# Patient Record
Sex: FEMALE | URBAN - METROPOLITAN AREA
[De-identification: names, ages, dates, MRNs, and addresses within clinical notes are randomized per-mention and may not be internally consistent; named-entity substitution may affect disease eponyms.]

---

## 2022-04-12 ENCOUNTER — OFFICE VISIT (OUTPATIENT)
Dept: URBAN - METROPOLITAN AREA CLINIC 6 | Facility: CLINIC | Age: 52
End: 2022-04-12

## 2022-04-12 DIAGNOSIS — B02.33 ZOSTER KERATITIS: Primary | ICD-10-CM

## 2022-04-12 PROCEDURE — 92002 INTRM OPH EXAM NEW PATIENT: CPT | Performed by: OPTOMETRIST

## 2022-04-12 RX ORDER — TRIFLURIDINE 1 G/100ML
1 % SOLUTION OPHTHALMIC
Qty: 7.5 | Refills: 0 | Status: ACTIVE
Start: 2022-04-12

## 2022-04-12 NOTE — IMPRESSION/PLAN
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 Plan: Emergency visit for HZV Dermatitis / Keratitis LT - Start Valacyclovir 1000mg PO q8h. Given corneal involvement, start viroptic Q2h to the affected eye. Stressed the importance of contact precautions.

## 2022-04-19 ENCOUNTER — OFFICE VISIT (OUTPATIENT)
Dept: URBAN - METROPOLITAN AREA CLINIC 6 | Facility: CLINIC | Age: 52
End: 2022-04-19

## 2022-04-19 DIAGNOSIS — B02.33 ZOSTER KERATITIS: Primary | ICD-10-CM

## 2022-04-19 PROCEDURE — 92012 INTRM OPH EXAM EST PATIENT: CPT | Performed by: OPTOMETRIST

## 2022-04-19 RX ORDER — VALACYCLOVIR 1 G/1
TABLET, FILM COATED ORAL
Qty: 14 | Refills: 0 | Status: INACTIVE
Start: 2022-04-19 | End: 2022-04-25

## 2022-04-19 RX ORDER — PREDNISOLONE ACETATE 10 MG/ML
1 % SUSPENSION/ DROPS OPHTHALMIC
Qty: 10 | Refills: 0 | Status: ACTIVE
Start: 2022-04-19

## 2022-04-19 RX ORDER — METHYLPREDNISOLONE 4 MG/1
4 MG TABLET ORAL
Qty: 21 | Refills: 0 | Status: ACTIVE
Start: 2022-04-19

## 2022-04-19 ASSESSMENT — INTRAOCULAR PRESSURE
OD: 8
OS: 13

## 2022-04-19 NOTE — IMPRESSION/PLAN
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 Plan: Follow up visit for HZV Dermatitis / Keratitis LT - Continue Valacyclovir 1000mg PO BID. Given corneal involvement, continue Viroptic QID to the affected eye and start Prednisolone BID. Start medrol dose hiren. Stressed the importance of contact precautions.

## 2022-05-03 ENCOUNTER — OFFICE VISIT (OUTPATIENT)
Dept: URBAN - METROPOLITAN AREA CLINIC 6 | Facility: CLINIC | Age: 52
End: 2022-05-03

## 2022-05-03 DIAGNOSIS — B02.33 ZOSTER KERATITIS: Primary | ICD-10-CM

## 2022-05-03 PROCEDURE — 92002 INTRM OPH EXAM NEW PATIENT: CPT | Performed by: OPHTHALMOLOGY

## 2022-05-03 RX ORDER — ACYCLOVIR 400 MG/1
400 MG TABLET ORAL
Qty: 60 | Refills: 2 | Status: INACTIVE
Start: 2022-05-03 | End: 2022-05-24

## 2022-05-03 ASSESSMENT — INTRAOCULAR PRESSURE
OS: 8
OD: 13

## 2022-05-03 NOTE — IMPRESSION/PLAN
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 Plan: Follow up visit for HZV Dermatitis / Keratitis LT - Improving. START alacyclovir 400mg PO BID. Taper Viroptic QD to the affected eye for 5 days then DC and cont Prednisolone gtts BID. IOP good. AC quiet. Has midilated irreactive pupil. Explained that that might persist. FP/RNFL today.

## 2022-05-24 ENCOUNTER — OFFICE VISIT (OUTPATIENT)
Dept: URBAN - METROPOLITAN AREA CLINIC 6 | Facility: CLINIC | Age: 52
End: 2022-05-24

## 2022-05-24 PROCEDURE — 92012 INTRM OPH EXAM EST PATIENT: CPT | Performed by: OPHTHALMOLOGY

## 2022-05-24 RX ORDER — PREDNISOLONE ACETATE 10 MG/ML
1 % SUSPENSION/ DROPS OPHTHALMIC
Qty: 10 | Refills: 0 | Status: ACTIVE
Start: 2022-05-24

## 2022-05-24 RX ORDER — ACYCLOVIR 400 MG/1
400 MG TABLET ORAL
Qty: 60 | Refills: 2 | Status: ACTIVE
Start: 2022-05-24

## 2022-05-24 ASSESSMENT — INTRAOCULAR PRESSURE
OD: 13
OS: 11

## 2022-05-24 NOTE — IMPRESSION/PLAN
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 Plan: Follow up visit for HZV Dermatitis / Keratitis LT - K clear. RE-START alacyclovir 400mg PO BID. Taper Viroptic QD to the affected eye for 5 days then DC and cont Prednisolone gtts BID. IOP good. AC quiet. Has midilated irreactive pupil. Explained that that might persist. Fundus exam with no optic neuritis/retinitis/vitritis.  RTC 6 wks, sooner prn

## 2022-07-05 ENCOUNTER — OFFICE VISIT (OUTPATIENT)
Dept: URBAN - METROPOLITAN AREA CLINIC 6 | Facility: CLINIC | Age: 52
End: 2022-07-05

## 2022-07-05 DIAGNOSIS — B02.33 ZOSTER KERATITIS: Primary | ICD-10-CM

## 2022-07-05 PROCEDURE — 92012 INTRM OPH EXAM EST PATIENT: CPT | Performed by: OPTOMETRIST

## 2022-07-05 RX ORDER — ACYCLOVIR 400 MG/1
400 MG TABLET ORAL
Qty: 60 | Refills: 1 | Status: ACTIVE
Start: 2022-07-05

## 2022-07-05 ASSESSMENT — INTRAOCULAR PRESSURE
OS: 9
OD: 14

## 2022-07-05 NOTE — IMPRESSION/PLAN
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 Plan: Follow up visit for HZV Dermatitis / Keratitis LT - K clear. Continue alacyclovir 400mg PO BID. Cont Prednisolone gtts TID. IOP good. Mild Inflammation in AC. Has midilated irreactive pupil. Explained that that might persist.  RTC 1 month for IOP check and FU.

## 2022-08-23 ENCOUNTER — OFFICE VISIT (OUTPATIENT)
Dept: URBAN - METROPOLITAN AREA CLINIC 6 | Facility: CLINIC | Age: 52
End: 2022-08-23

## 2022-08-23 DIAGNOSIS — B02.33 ZOSTER KERATITIS: Primary | ICD-10-CM

## 2022-08-23 PROCEDURE — 92012 INTRM OPH EXAM EST PATIENT: CPT | Performed by: OPTOMETRIST

## 2022-08-23 ASSESSMENT — INTRAOCULAR PRESSURE
OD: 13
OS: 7

## 2022-08-23 NOTE — IMPRESSION/PLAN
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 Plan: Follow up visit for HZV Dermatitis / Keratitis LT - K clear. Continue acyclovir 400mg PO BID. Cont Prednisolone gtts BID. IOP good. Mild Inflammation in AC. Has mid dilated irreactive pupil. Explained that might persist.  IOP is normal. Ptosis and epiphora. Plan to start nighttime ointment or gel. Recommend consult with Dr Pilo Preciado for oculoplastics.

## 2022-10-18 ENCOUNTER — OFFICE VISIT (OUTPATIENT)
Dept: URBAN - METROPOLITAN AREA CLINIC 6 | Facility: CLINIC | Age: 52
End: 2022-10-18

## 2022-10-18 DIAGNOSIS — H26.9 CATARACT: ICD-10-CM

## 2022-10-18 DIAGNOSIS — B02.33 ZOSTER KERATITIS: Primary | ICD-10-CM

## 2022-10-18 DIAGNOSIS — H16.232 NEUROTROPHIC KERATOCONJUNCTIVITIS, LEFT EYE: ICD-10-CM

## 2022-10-18 PROCEDURE — 92285 EXTERNAL OCULAR PHOTOGRAPHY: CPT | Performed by: OPHTHALMOLOGY

## 2022-10-18 PROCEDURE — 99214 OFFICE O/P EST MOD 30 MIN: CPT | Performed by: OPHTHALMOLOGY

## 2022-10-18 RX ORDER — ERYTHROMYCIN 5 MG/G
OINTMENT OPHTHALMIC
Qty: 3.5 | Refills: 3 | Status: ACTIVE
Start: 2022-10-18

## 2022-10-18 ASSESSMENT — INTRAOCULAR PRESSURE
OS: 5
OD: 15

## 2022-10-18 NOTE — IMPRESSION/PLAN
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 Plan: Patient examined. Chart reviewed. Patient has severe scarring of the skin in the distribution of left V1, secondary ptosis, secondary chronic neurotrophic keratopathy and severe medicomentos. I advised: 1. Aggressive faciak massage BID with CeraVe Healing Ointment and Cicaplast.

2. D/C current ophthalmic antibiotic drops, and replace with EES OPhthalmic Ointment (use TID OS.) 3. Tarsorrhaphy, left lids advised ASAP. Quote fe3e for [id number].

4.  Continue acyclovir 400mg PO BID. Cont Prednisolone gtts BID.

## 2022-10-31 ENCOUNTER — PROCEDURE (OUTPATIENT)
Dept: URBAN - METROPOLITAN AREA CLINIC 3 | Facility: CLINIC | Age: 52
End: 2022-10-31

## 2022-10-31 DIAGNOSIS — H16.232 NEUROTROPHIC KERATOCONJUNCTIVITIS, LEFT EYE: ICD-10-CM

## 2022-10-31 DIAGNOSIS — B02.33 ZOSTER KERATITIS: Primary | ICD-10-CM

## 2022-10-31 PROCEDURE — 67875 CLOSURE OF EYELID BY SUTURE: CPT | Performed by: OPHTHALMOLOGY

## 2022-10-31 RX ORDER — ACYCLOVIR 400 MG/1
400 MG TABLET ORAL
Qty: 60 | Refills: 1 | Status: ACTIVE
Start: 2022-10-31

## 2022-10-31 RX ORDER — PREDNISOLONE ACETATE 10 MG/ML
1 % SUSPENSION/ DROPS OPHTHALMIC
Qty: 10 | Refills: 0 | Status: ACTIVE
Start: 2022-10-31

## 2022-10-31 NOTE — IMPRESSION/PLAN
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 Plan: Under the care of Dr. Melvi Conrad who recommended a tarsorrhapy. Temporary lateral tarsorrhaphy placed in the left lateral [place] area under local anesthesia w/o complications. Cont. Acyclovir 400mg Po BID and PF 1% BID. F/U per schedule with Dr. Melvi Conrad.

## 2022-11-15 ENCOUNTER — OFFICE VISIT (OUTPATIENT)
Dept: URBAN - METROPOLITAN AREA CLINIC 6 | Facility: CLINIC | Age: 52
End: 2022-11-15

## 2022-11-15 DIAGNOSIS — B02.33 ZOSTER KERATITIS: Primary | ICD-10-CM

## 2022-11-15 PROCEDURE — 92012 INTRM OPH EXAM EST PATIENT: CPT | Performed by: OPHTHALMOLOGY

## 2022-11-15 ASSESSMENT — INTRAOCULAR PRESSURE: OD: 15

## 2022-11-15 NOTE — IMPRESSION/PLAN
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 Plan: Patient examined. Chart reviewed. Patient has severe scarring of the skin in the distribution of left V1, secondary ptosis, secondary chronic neurotrophic keratopathy and severe medicomentos. Aggressive facial massage BID with CeraVe Healing Ointment and Cicaplast.

 Tarsorrhaphy, left lids advised ASAP. Plan for 73401 in clinic block 30 min slot Continue acyclovir 400mg PO BID. Cont Prednisolone gtts BID.

## 2022-12-13 ENCOUNTER — PROCEDURE (OUTPATIENT)
Dept: URBAN - METROPOLITAN AREA CLINIC 6 | Facility: CLINIC | Age: 52
End: 2022-12-13

## 2022-12-13 DIAGNOSIS — B02.33 ZOSTER KERATITIS: Primary | ICD-10-CM

## 2022-12-13 PROCEDURE — 67875 CLOSURE OF EYELID BY SUTURE: CPT | Performed by: OPHTHALMOLOGY

## 2022-12-13 PROCEDURE — 92012 INTRM OPH EXAM EST PATIENT: CPT | Performed by: OPHTHALMOLOGY

## 2022-12-13 RX ORDER — ERYTHROMYCIN 5 MG/G
OINTMENT OPHTHALMIC
Qty: 3.5 | Refills: 3 | Status: ACTIVE
Start: 2022-12-13

## 2022-12-13 ASSESSMENT — INTRAOCULAR PRESSURE
OS: 6
OD: 12

## 2022-12-13 NOTE — IMPRESSION/PLAN
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 Plan: Patient examined. Chart reviewed. Patient has severe scarring of the skin in the distribution of left V1, secondary ptosis, secondary chronic neurotrophic keratopathy and severe medicomentos. Aggressive facial massage BID with CeraVe Healing Ointment and Cicaplast.

Tarsorrhaphy, left lids [id number] Continue acyclovir 400mg PO BID. D/C Prednisolone gtts.  Start erythromycin baltazar QHS OS

## 2023-01-10 ENCOUNTER — OFFICE VISIT (OUTPATIENT)
Dept: URBAN - METROPOLITAN AREA CLINIC 6 | Facility: CLINIC | Age: 53
End: 2023-01-10

## 2023-01-10 DIAGNOSIS — B02.33 ZOSTER KERATITIS: Primary | ICD-10-CM

## 2023-01-10 PROCEDURE — 92012 INTRM OPH EXAM EST PATIENT: CPT | Performed by: OPHTHALMOLOGY

## 2023-01-10 RX ORDER — ERYTHROMYCIN 5 MG/G
OINTMENT OPHTHALMIC
Qty: 3.5 | Refills: 3 | Status: ACTIVE
Start: 2023-01-10

## 2023-01-10 ASSESSMENT — INTRAOCULAR PRESSURE
OD: 13
OS: 5

## 2023-01-10 NOTE — IMPRESSION/PLAN
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 Plan: Patient examined. Chart reviewed. Patient has severe scarring of the skin in the distribution of left V1, secondary ptosis, secondary chronic neurotrophic keratopathy and severe medicomentos. Continue facial massage BID with CeraVe Healing Ointment and Cicaplast.

Much improved. Continue acyclovir 400mg PO BID. Continue erythromycin baltazar QHS OS. No sign of infection. No lag.

## 2023-04-04 ENCOUNTER — OFFICE VISIT (OUTPATIENT)
Dept: URBAN - METROPOLITAN AREA CLINIC 6 | Facility: CLINIC | Age: 53
End: 2023-04-04

## 2023-04-04 DIAGNOSIS — B02.33 ZOSTER KERATITIS: Primary | ICD-10-CM

## 2023-04-04 PROCEDURE — 92012 INTRM OPH EXAM EST PATIENT: CPT | Performed by: OPHTHALMOLOGY

## 2023-04-04 RX ORDER — ERYTHROMYCIN 5 MG/G
OINTMENT OPHTHALMIC
Qty: 3.5 | Refills: 6 | Status: ACTIVE
Start: 2023-04-04

## 2023-04-04 ASSESSMENT — INTRAOCULAR PRESSURE
OD: 15
OS: 7

## 2023-04-04 NOTE — IMPRESSION/PLAN
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 Plan: Patient examined. Chart reviewed. Patient has severe scarring of the skin in the distribution of left V1, secondary ptosis, secondary chronic neurotrophic keratopathy and severe medicomentos. NO CORNEAL INFILTRATE OR STAINING OS ON EXAM TODAY. Continue facial massage BID with CeraVe Healing Ointment and Cicaplast.

Much improved. d/c acyclovir 400mg PO BID. Continue erythromycin baltazar QHS OS. No sign of infection. No lag. EES BALTAZAR AT HS OS, GENTEAL GEL OS TID. F/U BY PHONE IN 2 WEEKS. FOLLOW CATARACTS OU, AND CORNEAL SCAR OS.